# Patient Record
(demographics unavailable — no encounter records)

---

## 2024-11-19 NOTE — HISTORY OF PRESENT ILLNESS
[de-identified] : 89 y/o RHD female who was seen in the past for right shoulder OA presents with a slip and fall onto her table on 11/16/2024. She was taken to the hospital the next day and was advised to follow up with an Orthopedist. The pain is an 8/10, which she takes Aleve. It does hurt to move shoulder. The pain does not wake her up at night. She has limited ROM due to the pain.   Hx: DM HgA1C approximately 8 about 2 months ago,

## 2024-11-19 NOTE — PHYSICAL EXAM
[B.R.] : biceps 2+ and symmetric bilaterally [Rad] : radial 2+ and symmetric bilaterally [de-identified] : Pt is a pleasant 88 year old female, AAOx3 with no apparent distress, 21 BMI. Physical examination of right shoulder reveals normal contours with no deformity, skin intact, with ecchymosis over mid humerus with no signs of infection, no erythema, no swelling, no distal lymphedema, no patholaxity, no muscle atrophy noted. ROM of right shoulder reveals FF active < 20, passive 40, ER 30, IR L5-S1. Positive Drop Arm test. Motor strength 5/5 throughout the arc of motion. No neurological deficits noted. [de-identified] : X-rays from PACS 11.17.24 interpreted by me today: 3 views of the right shoulder demonstrate mod joint space narrowing, sclerosis, bone spurs, with no acute fracture or dislocation.

## 2024-11-19 NOTE — HISTORY OF PRESENT ILLNESS
[de-identified] : 89 y/o RHD female who was seen in the past for right shoulder OA presents with a slip and fall onto her table on 11/16/2024. She was taken to the hospital the next day and was advised to follow up with an Orthopedist. The pain is an 8/10, which she takes Aleve. It does hurt to move shoulder. The pain does not wake her up at night. She has limited ROM due to the pain.   Hx: DM HgA1C approximately 8 about 2 months ago,

## 2024-11-19 NOTE — PHYSICAL EXAM
[B.R.] : biceps 2+ and symmetric bilaterally [Rad] : radial 2+ and symmetric bilaterally [de-identified] : Pt is a pleasant 88 year old female, AAOx3 with no apparent distress, 21 BMI. Physical examination of right shoulder reveals normal contours with no deformity, skin intact, with ecchymosis over mid humerus with no signs of infection, no erythema, no swelling, no distal lymphedema, no patholaxity, no muscle atrophy noted. ROM of right shoulder reveals FF active < 20, passive 40, ER 30, IR L5-S1. Positive Drop Arm test. Motor strength 5/5 throughout the arc of motion. No neurological deficits noted. [de-identified] : X-rays from PACS 11.17.24 interpreted by me today: 3 views of the right shoulder demonstrate mod joint space narrowing, sclerosis, bone spurs, with no acute fracture or dislocation.

## 2024-11-19 NOTE — DISCUSSION/SUMMARY
[de-identified] : Pt is a pleasant 88 year old female,  with right shoulder pain secondary to a mechanical fall. A lengthy discussion was held regarding the patient's condition and treatment options including all risks, benefits, prognosis and outcomes of each were discussed in detail. Discussed the natural history and course of this pathology. Pt informed she likely has a rotator cuff tear. Plan for 1) ice, 2) anti- inflammatories, 3) sling for comfort and 4) PT. Pt will follow up in one month. Pt raised concern regarding using the restroom, informed to use a bedpan and social work for a home health aide, for home PT, for a platform walker, and a commode. Pt at this time was informed she is not a surgical candidate due to rehab process. Pt inquired about possibility of motorized chairs, however this was not recommended as they are largely expensive and do not particularly benefit the pt. She may follow up PRN.  The patient express understanding and all questions were answered.

## 2024-11-19 NOTE — DISCUSSION/SUMMARY
[de-identified] : Pt is a pleasant 88 year old female,  with right shoulder pain secondary to a mechanical fall. A lengthy discussion was held regarding the patient's condition and treatment options including all risks, benefits, prognosis and outcomes of each were discussed in detail. Discussed the natural history and course of this pathology. Pt informed she likely has a rotator cuff tear. Plan for 1) ice, 2) anti- inflammatories, 3) sling for comfort and 4) PT. Pt will follow up in one month. Pt raised concern regarding using the restroom, informed to use a bedpan and social work for a home health aide, for home PT, for a platform walker, and a commode. Pt at this time was informed she is not a surgical candidate due to rehab process. Pt inquired about possibility of motorized chairs, however this was not recommended as they are largely expensive and do not particularly benefit the pt. She may follow up PRN.  The patient express understanding and all questions were answered.

## 2024-11-19 NOTE — CONSULT LETTER
[Dear  ___] : Dear  [unfilled], [FreeTextEntry1] : I had the pleasure of evaluating your patient in the office today for complaints of right shoulder pain secondary to a mechanical fall. I have enclosed a copy of today's office notes for your charts and for your review.  Sincerely,   Alexandre Mejia M.D. Professor and  Department of Orthopedic Surgery [facility] [place]

## 2024-11-19 NOTE — CONSULT LETTER
[Dear  ___] : Dear  [unfilled], 16 [FreeTextEntry1] : I had the pleasure of evaluating your patient in the office today for complaints of right shoulder pain secondary to a mechanical fall. I have enclosed a copy of today's office notes for your charts and for your review.  Sincerely,   Alexandre Mejia M.D. Professor and  Department of Orthopedic Surgery [facility] [place]